# Patient Record
Sex: MALE | Race: WHITE | URBAN - METROPOLITAN AREA
[De-identification: names, ages, dates, MRNs, and addresses within clinical notes are randomized per-mention and may not be internally consistent; named-entity substitution may affect disease eponyms.]

---

## 2020-04-24 ENCOUNTER — TELEPHONE (OUTPATIENT)
Dept: INTERNAL MEDICINE CLINIC | Age: 30
End: 2020-04-24

## 2020-04-24 NOTE — LETTER
Doni Jennings MD  [address] Internal Medicine  [address] [place] [address], [address] [place] [address], [address], [address] [facility]       April 24, 2020     Patient: Maximiliano Seay   YOB: 1990   Date of Visit: 4/24/20       To Whom It May Concern:    Mr. Tobin Whatley ([birth year]) unfortunately remains ill 4/24/20 and is still medically unable to work Wednesday 4/8/20 through Sunday 5/31/20 inclusive. Though Mr Donnell Williamson' clinical progress with treatment is substantial and ongoing, Mr Donnell Williamson remains medically unable to resume career employment activities at [employer] until Monday 6/1/20 based on the pace of his medical recovery 4/8/20 through 4/24/20. Mr. Tobin Sanchez. Chacorta' illness since 4/8/20 is NOT communicable or contagious and is not related to the COVID-19 pandemic. Mr. Tobin Avila ongoing illness does not carry any risk to those living or working with him in close physical proximity. Mr. Donnell Williamson remains under my active and ongoing care.       Sincerely,        Doni Jennings MD

## 2020-04-24 NOTE — TELEPHONE ENCOUNTER
4/24/20 Kaiden Whatley ([birth year]) F/U Illness onset 4/8/20, assess recovery pace. Acute sinopulmonary & GI symptoms substantially improving 4/24/20 since onset 4/8/20. Lingering nature of malaise, joint stiffness, fatigue, headache, mind fog preclude resumption of normal demanding [facility] career responsibilities still through 5/31/20 as stated in 4/8/20 illness work excuse. Explanatory note for employer [facility] provided 4/24/20. Prognosis for full recovery very good though protracted convalescent time requirement being medically unable to work 4/8/20 -5/31/20 inclusive quite frustrating to Mr. Jefferson Gamez. 4/24/20 Nighat Gamez unfortunately remains ill 4/24/20, still unable to work Wed 4/8/20 -Sun 5/31/20 inclusive, resuming work at [employer] 6/1/20 Mon.  4/24/20 Cash Guzman ([birth year]) 25yo MWM. Lives with Lydia Gonsales [phone] secure wife /life partner. Career: [employer]  since Jan 2018. Until Jan 2018  [employer]. Earned [employer] [id number]. Education: [employer] grad  2010, [employer], [email or web address] grad. Studying for [id number]  7/2016. Hobbies: Outdoors, reading & intellectual pursuits, baseball. Habits: 8 pk yr former smoker. Quit 1/2 ppd x16 yrs smoking 7/1/14. EtOH 6 drinks weekly max, never to excess. Exercises intensely 3 days weekly.